# Patient Record
Sex: MALE | Race: WHITE | NOT HISPANIC OR LATINO | URBAN - METROPOLITAN AREA
[De-identification: names, ages, dates, MRNs, and addresses within clinical notes are randomized per-mention and may not be internally consistent; named-entity substitution may affect disease eponyms.]

---

## 2023-10-02 ENCOUNTER — OFFICE (OUTPATIENT)
Dept: URBAN - METROPOLITAN AREA CLINIC 16 | Facility: CLINIC | Age: 53
End: 2023-10-02
Payer: COMMERCIAL

## 2023-10-02 VITALS
OXYGEN SATURATION: 97 % | HEART RATE: 65 BPM | WEIGHT: 240 LBS | HEIGHT: 70 IN | DIASTOLIC BLOOD PRESSURE: 70 MMHG | SYSTOLIC BLOOD PRESSURE: 122 MMHG

## 2023-10-02 DIAGNOSIS — Z80.0 FAMILY HISTORY OF MALIGNANT NEOPLASM OF DIGESTIVE ORGANS: ICD-10-CM

## 2023-10-02 DIAGNOSIS — K21.9 GASTRO-ESOPHAGEAL REFLUX DISEASE WITHOUT ESOPHAGITIS: ICD-10-CM

## 2023-10-02 PROCEDURE — 99204 OFFICE O/P NEW MOD 45 MIN: CPT | Performed by: INTERNAL MEDICINE

## 2023-10-02 NOTE — SERVICENOTES
I will hold off EGD and proceed with colonoscopy alone and he may take PPI as needed and have antireflux measures accordingly

## 2023-10-02 NOTE — SERVICEHPINOTES
Sheng Davalos   is a   53   year old   male   patient who is seen   at the request of   Marisela Durant   for a consultation/initial visit.  Sheng presents for consultation at this time and needs to have screening colonoscopy. He does have a history of reflux otherwise and has been on pantoprazole. His history is noted for history of asthma and use of inhalers and history of thyroid disease hypothyroidism and Synthroid use. He also uses Dulera and inhalers for his asthma in regards to his other history he has glipizide and metformin for diabetic status and for his anxiety and depression uses Zoloft and Remeron
maegan Bernabe will have the colonoscopy for screening purposes and he has a family history of rectal cancer with his grandfather. He is doing well in regards to symptoms and in regards to reflux I can hold off on the EGD with history of reflux but not even needing medication after significant weight loss and history of hiatal hernia appears to abated his symptoms may take his PPI once per week now 
maegan issa